# Patient Record
(demographics unavailable — no encounter records)

---

## 2024-11-13 NOTE — REVIEW OF SYSTEMS
[Negative] : Integumentary [Skin Hyperpigmentation: Grade 1 - Hyperpigmentation covering <10% BSA; no psychosocial impact] : Skin Hyperpigmentation: Grade 1 - Hyperpigmentation covering <10% BSA; no psychosocial impact [Dermatitis Radiation: Grade 0] : Dermatitis Radiation: Grade 0

## 2024-11-13 NOTE — VITALS
[Maximal Pain Intensity: 0/10] : 0/10 [Least Pain Intensity: 0/10] : 0/10 [NoTreatment Scheduled] : no treatment scheduled [100: Normal, no complaints, no evidence of disease.] : 100: Normal, no complaints, no evidence of disease. [3 - Distress Level] : Distress Level: 3

## 2024-11-19 NOTE — DISEASE MANAGEMENT
[Pathological] : TNM Stage: p [IA] : IA [FreeTextEntry4] : ER/IL+ Invasive Ductal Carcinoma of the Left Breast  [TTNM] : 1c [NTNM] : 0 [MTNM] : 0

## 2024-11-19 NOTE — PHYSICAL EXAM
[Normal] : oriented to person, place and time, the affect was normal, the mood was normal and not anxious [de-identified] : s/p left partial mastectomy

## 2024-11-19 NOTE — DISEASE MANAGEMENT
[Pathological] : TNM Stage: p [IA] : IA [FreeTextEntry4] : ER/VA+ Invasive Ductal Carcinoma of the Left Breast  [TTNM] : 1c [NTNM] : 0 [MTNM] : 0

## 2024-11-19 NOTE — PHYSICAL EXAM
[Normal] : oriented to person, place and time, the affect was normal, the mood was normal and not anxious [de-identified] : s/p left partial mastectomy

## 2024-11-19 NOTE — HISTORY OF PRESENT ILLNESS
[FreeTextEntry1] : Ms. Burt is a 79-year-old female with Stage IA T1cN0 left breast invasive ductal carcinoma ER/KS+. She is s/p left partial mastectomy and sentinel lymph node biopsy followed by adjuvant radiation therapy. She is present today for her routine follow-up.   Of note, she does have a history of ovarian cancer and received radioactive phosphorous 32 intraperitoneal in 1988 and has been MILLY since.   Ms. Burt initially presented for a screening mammogram to [facility] on 5/18/23 that revealed an asymmetric density of the upper left breast. Diagnostic mammogram was completed on 6/26/23 and revealed a hypoechoic 9 mm mass 8 cm from the nipple corresponding to the initial findings.   She underwent an US-guided biopsy of the left breast on 7/3/23, pathology demonstrated an intermediate grade invasive ductal carcinoma which was ER/KS strongly positive.   An MRI of the breast was completed on 8/15/23 that showed the known left breast cancer at 3:00 measuring 2.3 cm with an area of enhancement just superior and medial to the known cancer as well as a finding in the right breast at the 6:00 axis and an MRI-guided biopsy was recommended. On 8/22/23 an MRI-guided biopsy was performed and showed in the left breast 3:00 anterior and right breast 6:00  revealed intraductal papillomas with ductal hyperplasia but no atypia.   On 9/19/23 Ms. Burt underwent a left partial mastectomy and sentinel lymph node biopsy with excision of the separate 3:00 papilloma in the same specimen performed by  Dr. Carcamo. Final pathology revealed a 1.6 cm intermediate grade invasive ductal carcinoma with neuroendocrine features with a 1.6cm focus of grade II DCIS, negative for EIC. All margins were negative with the nearest margin being 8 mm from the inferior margin for both the DCIS and the invasive component. 0/3 lymph nodes positive for metastasis.   Oncotype score was 9. Per patient, genetics testing had been completed previously and was negative.   Ms. Burt completed adjuvant radiation therapy to the left breast, to a total of 4240 cGy on 12/18/23.   Ms. Burt reports she is doing well. She reports mild tenderness to the treated left breast.  She reports nipple distortion and rash and was treated with fungal cream with good response. She continues on Anastrozole - she reports some side effects including fatigue. She has follow-up with Dr. Carcamo in September 2024. She had routine bilateral mammography and ultrasound (July 2024) that demonstrated no mammographic or sonographic evidence of malignancy. She had follow-up with medical oncology in September. She reports she fell walking and broke her hand. She is now in PT/OT. She is otherwise is feeling well and she is staying active.

## 2024-11-19 NOTE — HISTORY OF PRESENT ILLNESS
[FreeTextEntry1] : Ms. Burt is a 79-year-old female with Stage IA T1cN0 left breast invasive ductal carcinoma ER/FL+. She is s/p left partial mastectomy and sentinel lymph node biopsy followed by adjuvant radiation therapy. She is present today for her routine follow-up.   Of note, she does have a history of ovarian cancer and received radioactive phosphorous 32 intraperitoneal in 1988 and has been MILLY since.   Ms. Burt initially presented for a screening mammogram to [facility] on 5/18/23 that revealed an asymmetric density of the upper left breast. Diagnostic mammogram was completed on 6/26/23 and revealed a hypoechoic 9 mm mass 8 cm from the nipple corresponding to the initial findings.   She underwent an US-guided biopsy of the left breast on 7/3/23, pathology demonstrated an intermediate grade invasive ductal carcinoma which was ER/FL strongly positive.   An MRI of the breast was completed on 8/15/23 that showed the known left breast cancer at 3:00 measuring 2.3 cm with an area of enhancement just superior and medial to the known cancer as well as a finding in the right breast at the 6:00 axis and an MRI-guided biopsy was recommended. On 8/22/23 an MRI-guided biopsy was performed and showed in the left breast 3:00 anterior and right breast 6:00  revealed intraductal papillomas with ductal hyperplasia but no atypia.   On 9/19/23 Ms. Burt underwent a left partial mastectomy and sentinel lymph node biopsy with excision of the separate 3:00 papilloma in the same specimen performed by  Dr. Carcamo. Final pathology revealed a 1.6 cm intermediate grade invasive ductal carcinoma with neuroendocrine features with a 1.6cm focus of grade II DCIS, negative for EIC. All margins were negative with the nearest margin being 8 mm from the inferior margin for both the DCIS and the invasive component. 0/3 lymph nodes positive for metastasis.   Oncotype score was 9. Per patient, genetics testing had been completed previously and was negative.   Ms. Burt completed adjuvant radiation therapy to the left breast, to a total of 4240 cGy on 12/18/23.   Ms. Burt reports she is doing well. She reports mild tenderness to the treated left breast.  She reports nipple distortion and rash and was treated with fungal cream with good response. She continues on Anastrozole - she reports some side effects including fatigue. She has follow-up with Dr. Carcamo in September 2024. She had routine bilateral mammography and ultrasound (July 2024) that demonstrated no mammographic or sonographic evidence of malignancy. She had follow-up with medical oncology in September. She reports she fell walking and broke her hand. She is now in PT/OT. She is otherwise is feeling well and she is staying active.

## 2025-01-15 NOTE — HISTORY OF PRESENT ILLNESS
[de-identified] : Ms. Burt is a 78 year old female with a history of a personal history of ovarian cancer diagnosed in  s/p SHELLY BSO s/p intraperitoneal infusion of P32. She notes she then received chemo about 2 years later for a precancerous vaginal cyst that presents for initial consultation referred by Dr. Carcamo for newly diagnosed breast cancer.   Oncological History:   2023 s/p screening mammography at [facility] showing an asymmetric density in the left breast upper quadrant  2023 s/p diagnostic mammography and ultrasound showed an 8mm suspicious finding in the 3:00 region 8 cm from the nipple.   2023 s/p Ultrasound-guided core biopsy showed a moderately differentiated invasive duct cancer which was ER/CO strongly positive and HER2/ronaldo negative.   2023 s/p MRI showing the known left breast 3:00 cancer measuring 2.3 cm and some enhancement superior and medial to the known cancer in the left breast as well as a finding in the right breast 6:00 region.   2023 s/p MRI core biopsies showed a separate left breast anterior 3:00 finding and right breast 6:00 finding to be intraductal papillomas with no atypia.   23 s/p Partial mastectomy with sentinel lymph node biopsy removal the separate left breast 3:00 papilloma within the same partial mastectomy final pathology showed a 1.6 cm intermediate grade invasive duct cancer with negative margins and 3 negative left axillary lymph nodes.  Prognostic markers performed on block  D23: ER:>95% strong positive CO:>95% strong positive. KI-67: up to 30% Her2: Negative (score 1+)  Oncotype recurrence 9.   Breast Cancer Risk Factors: Menarche: 11 Date of LMP: age 43 Menopause: age 43  Age at first live birth: 19 Nursed: yes Hysterectomy: yes Oophorectomy: yes OCP: yes 5 years HRT: yes Last pap/pelvic exam: has not gone in many years  Related family history family history of breast cancer and a personal history of ovarian cancer diagnosed in  for which she underwent an intraperitoneal infusion of P32. Ashkenazi: no BRCA testing yes had in the past was negative per patient   [de-identified] : Patient seen and examined for routine follow up.  Saw Dr. Carcamo 9/13/24. Remains on Anastrazole tolerating. +recent mechanical fall broke hand. In splint currently and seeing hand surgeon tomorrow. s/p mammo/sono 7/2024 wnl. GALLITO 2 was referred back to cardio/primary.

## 2025-01-15 NOTE — CONSULT LETTER
[Dear  ___] : Dear  [unfilled], [Consult Letter:] : I had the pleasure of evaluating your patient, [unfilled]. [Please see my note below.] : Please see my note below. [Consult Closing:] : Thank you very much for allowing me to participate in the care of this patient.  If you have any questions, please do not hesitate to contact me. [Sincerely,] : Sincerely, [FreeTextEntry3] : Jeana Major DO, FACSUSAN, FACP Medical Oncology and Hematology [facility]

## 2025-01-15 NOTE — ASSESSMENT
[FreeTextEntry1] : #Breast Cancer - 1.6 cm intermediate grade invasive duct cancer with negative margins and 3 negative left axillary lymph nodes.  ER/OR+, HER2 -  - Reviewed the diagnosis, prognosis and natural history of ER/OR+, HER2 - IDC - Reviewed the imaging and path - Reviewed the NCCN guidelines and patient expresses understanding - Oncotype 9 - Currently receiving RT. Will complete soon - Plan for DEXA later this week will discuss results - As long as there is no sig osteoporosis will offer AI. Otherwise will discuss tamoxifen - She will continue to follow up with Dr. Carcamo and Dr. Cast - Bilateral mammography and ultrasound will be due in May 2024. - 2/21/24 vs and CBC reviewed; WBC 4.16, hgb 13.5, plt 370. Now s/p completion of adjuvant radiation therapy to the left breast, to a total of 4240 cGy. She has some residual fatigue and hyperpigmentation. She did have COVID s/p paxlovid and did not require hospitalization, some exertional dyspnea with COVID and RT when going up stairs. Nothing at rest O2 wnl. Saw Dr. Carcamo today. Reviewed DEXA 12/2023 revealing L spine -1.5, L fem neck -1.9, L hip -0.5. Given osteopenia will give AI. Reviewed side effects and logistics. Will offer Anastrazole 1mg daily. Ca++ 1200mg daily vit D 800iu daily. We will monitor DEXA q2y.  - 9/2024 vs and labs reviewed; Raajn missed few appts remains on anastrazole, tolerating. Continue. s/p follow up with Dr. Carcamo 9/2024 note reviewed. s/p mammo/sono 7/2024 wnl. GALLITO 2 referred to cardio/primary   #Osteopenia  - Reviewed DEXA 12/2023 revealing L spine -1.5, L fem neck -1.9, L hip -0.5. Given osteopenia will give AI - Ca++ 1200mg daily vit D 800iu daily.  - We will monitor DEXA q2y.  - weight bearing exercise - Limit ETOH  - Maintain healthy BMI  - Given osteopenia discussed bisphosphonates in future such as prolia vs zometa would need dental clearance  #Vit D  - Monitor levels   #Hand Fracture - In splint following mechanical fall  - Seeing hand surgeon tomorrow   #Health Maintenance  - CNY UTD per patient  - GYN UTD  - Mammo/sono 7/2024  - DEXA 2023  RTC in 3 months with cbc with diff, cmp, vit D  Case and management discussed with Pedrito Valdez

## 2025-01-15 NOTE — PHYSICAL EXAM
[Fully active, able to carry on all pre-disease performance without restriction] : Status 0 - Fully active, able to carry on all pre-disease performance without restriction [Normal] : affect appropriate [de-identified] : L lumpectomy scar and SLN bx, no masses nodules or LAD  [de-identified] : in R hand splint s/p fracture. Going to see hand surgeon tomorrow +ecchymosis

## 2025-01-15 NOTE — REVIEW OF SYSTEMS
[Joint Pain] : joint pain [Negative] : Allergic/Immunologic [FreeTextEntry9] : s/p broken hand in splint with brusiing

## 2025-02-03 NOTE — PAST MEDICAL HISTORY
[Menarche Age ____] : age at menarche was [unfilled] [History of Hormone Replacement Treatment] : has a history of hormone replacement treatment [Total Preg ___] : G[unfilled] [Age At Live Birth ___] : Age at live birth: [unfilled]

## 2025-02-03 NOTE — ADDENDUM
[FreeTextEntry1] : I spent greater than 75% of consultation in face-to-face counseling and coordination of care in this patient with a history of a left breast cancer who underwent a left breast partial mastectomy who comes in now for routine breast cancer screening/surveillance.

## 2025-02-03 NOTE — PHYSICAL EXAM
[Normocephalic] : normocephalic [Atraumatic] : atraumatic [Supple] : supple [No Supraclavicular Adenopathy] : no supraclavicular adenopathy [No Cervical Adenopathy] : no cervical adenopathy [Examined in the supine and seated position] : examined in the supine and seated position [No dominant masses] : no dominant masses in right breast  [No dominant masses] : no dominant masses left breast [No Nipple Retraction] : no left nipple retraction [No Nipple Discharge] : no left nipple discharge [Breast Mass Right Breast ___cm] : no masses [Breast Mass Left Breast ___cm] : no masses [Breast Nipple Inversion] : nipples not inverted [Breast Nipple Retraction] : nipples not retracted [Breast Nipple Flattening] : nipples not flattened [Breast Nipple Fissures] : nipples not fissured [Breast Abnormal Lactation (Galactorrhea)] : no galactorrhea [Breast Abnormal Secretion Serous Fluid] : no serous discharge [Breast Abnormal Secretion Bloody Fluid] : no bloody discharge [Breast Abnormal Secretion Opalescent Fluid] : no milky discharge [No Axillary Lymphadenopathy] : no left axillary lymphadenopathy [No Edema] : no edema [No Rashes] : no rashes [No Ulceration] : no ulceration [de-identified] : On exam, the patient is doing well after her left breast partial mastectomy and localized excisional biopsy.  Her wound has healed well and she received external beam radiation and she has no evidence of recurrence.  She had developed a significant seroma and I placed a seroma catheter which was pulled after 2 weeks.  She has no evidence of recurrent seroma.  She has no axillary, supraclavicular, or cervical adenopathy. [de-identified] : Status post partial mastectomy with separate localized excisional biopsy.  She developed a chronic seroma and I placed a seroma catheter which was removed after 2 weeks and she has no evidence of recurrent seroma.  She underwent external beam radiation and has no evidence of recurrence.

## 2025-02-03 NOTE — HISTORY OF PRESENT ILLNESS
[FreeTextEntry1] : The patient is a 79-year-old G8, P8 postmenopausal white female.  She underwent menopause in 1988 at age 43 after a SHELLY/BSO performed for fibroids which found an incidental ovarian cancer.  She did receive radioactive phosphorus 32 intraperitoneal at that time.  She has been MILLY from that ovarian cancer.  She never took any hormone replacement therapy.  She underwent menarche at age 11 and had her first child at age 19.  She has a family history with her sister who had breast cancer.  Her father had lung and esophageal cancer.  Her paternal grandmother had bladder cancer and a maternal uncle had colon cancer.  She underwent screening mammography at [facility] on May 18, 2023 showing an asymmetric density in the upper left breast.  Diagnostic left breast mammography and ultrasound performed on June 26, 2023 showed a hypoechoic 9 mm mass 8 cm from the nipple which seem to correspond to the mammographic finding.  Ultrasound-guided core biopsy was performed on July 3, 2023 at [facility] showing an intermediate grade invasive duct cancer which was ER/FL strongly positive greater than 95% and HER2 negative 1+ by IHC with a Ki-67 of 10%.  MRI was performed on August 15, 2023 which showed the known left breast 3:00 cancer measuring 2.3 cm with an area of enhancement just superior and medial to the known cancer as well as a finding in the right breast 6:00 region for which MRI guided biopsy was recommended.  MRI biopsies were performed on August 22, 2023 of the left breast 3:00 anterior finding and right breast 6:00 region and both of these biopsies showed intraductal papillomas with ductal hyperplasia but no atypia.  She had "infinity" clips placed at both biopsy sites.  A left breast partial mastectomy and sentinel lymph node biopsy was then performed with excision of this separate anterior 3:00 papilloma within the same specimen using 2 Hologic localizers on September 19, 2023.  The final pathology showed a 1.6 cm intermediate grade invasive duct cancer with neuroendocrine features and all her final margins were all negative.  She had 2 negative sentinel lymph nodes and 1 negative nonsentinel lymph node making this a pathologic prognostic stage IA breast cancer.   An Oncotype recurrence score was 9 and she was seen by Dr. Major and an AI was recommended after radiation therapy.  She underwent hypofractionated radiation therapy through Dr. Cast which finished on December 18, 2023 and she was then placed on anastrozole.  She had developed a postoperative seroma requiring placement of a seroma catheter which was left in for about 2 weeks.  She comes in now for routine follow-up.

## 2025-02-03 NOTE — REASON FOR VISIT
[Follow-Up: _____] : a [unfilled] follow-up visit [FreeTextEntry1] : The patient is a postmenopausal white female with a family history of breast cancer and a personal history of ovarian cancer diagnosed in 1988 for which she underwent an intraperitoneal infusion of P32.  The patient underwent a screening mammography at [facility] in May 2023 showing an asymmetric density in the left breast upper quadrant and diagnostic mammography and ultrasound performed in June 2023 showed an 8mm suspicious finding in the 3:00 region 8 cm from the nipple.  Ultrasound-guided core biopsy performed in July 2023 showed a moderately differentiated invasive duct cancer which was ER/KY strongly positive and HER2/ronaldo negative.  She underwent an MRI in August 2023 showing the known left breast 3:00 cancer measuring 2.3 cm and some enhancement superior and medial to the known cancer in the left breast as well as a finding in the right breast 6:00 region.  MRI core biopsies in August 2023 showed a separate left breast anterior 3:00 finding and right breast 6:00 finding to be intraductal papillomas with no atypia.  Partial mastectomy was recommended with sentinel lymph node biopsy and I also removed the separate left breast 3:00 papilloma within the same partial mastectomy on September 19, 2023.  The final pathology showed a 1.6 cm intermediate grade invasive duct cancer with negative margins and 3 negative left axillary lymph nodes making this a pathologic prognostic stage IA breast cancer.  Oncotype recurrence score was 9 and she was seen by Dr. Major and placed on anastrozole after radiation.  She underwent external beam radiation with Dr. Cast which finished in December 2023.  She did require a seroma catheter postoperatively.  She comes in now for routine follow-up. [FreeTextEntry2] : September 19, 2023

## 2025-02-03 NOTE — ASSESSMENT
[FreeTextEntry1] : The patient is a 79-year-old G8, P8 postmenopausal white female. She underwent menopause in 1988 at age 43 after a SHELLY/BSO performed for fibroids which found an incidental ovarian cancer. She did receive radioactive phosphorus 32 intraperitoneal at that time. She has been MILLY from that ovarian cancer. She never took any hormone replacement therapy. She underwent menarche at age 11 and had her first child at age 19. She has a family history with her sister who had breast cancer. Her father had lung and esophageal cancer. Her paternal grandmother had bladder cancer and a maternal uncle had colon cancer. She underwent screening mammography at [facility] on May 18, 2023 showing an asymmetric density in the upper left breast. Diagnostic left breast mammography and ultrasound performed on June 26, 2023 showed a hypoechoic 9 mm mass 8 cm from the nipple which seem to correspond to the mammographic finding. Ultrasound-guided core biopsy was performed on July 3, 2023 at [facility] showing an intermediate grade invasive duct cancer which was ER/ME strongly positive greater than 95% and HER2 negative 1+ by IHC with a Ki-67 of 10%.MRI was performed on August 15, 2023 which showed the known left breast 3:00 cancer measuring 2.3 cm with an area of enhancement just superior and medial to the known cancer as well as a finding in the right breast 6:00 region for which MRI guided biopsy was recommended. MRI biopsies were performed on August 22, 2023 of the left breast 3:00 anterior finding and right breast 6:00 region and both of these biopsies showed intraductal papillomas with ductal hyperplasia but no atypia. She had "infinity" clips placed at both biopsy sites. A left breast partial mastectomy and sentinel lymph node biopsy was then performed with excision of this separate anterior 3:00 papilloma within the same specimen using 2 Hologic localizers on September 19, 2023. The final pathology showed a 1.6 cm intermediate grade invasive duct cancer with neuroendocrine features and all her final margins were all negative. She had 2 negative sentinel lymph nodes and 1 negative nonsentinel lymph node making this a pathologic prognostic stage IA breast cancer.  The Oncotype recurrence score was 9 and she was seen by Dr. Major and anastrozole was recommended. She understood the need for radiation therapy and she underwent hypofractionated external beam radiation with Dr. Cast which finished on December 18, 2023.  She was seen by Dr. Major and placed on anastrozole.  She did develop a chronic recurring large seroma postoperatively and I had to place a seroma catheter.  She underwent her last bilateral mammography and ultrasound which was reviewed from July 17, 2024 performed at [place] which showed postsurgical changes in the left breast but no suspicious findings.  She had a breast arterial calcification score of 2 and she was given a copy of her report to speak to her primary care physician about possible cardiac workup due to correlation with cardiovascular disease ?????.  On exam today, she has done well from her left breast partial mastectomy but she has developed some volume loss on the lateral aspect of the left breast with some slight nipple lateral distortion.  She should continue to wear good compressive bra in the meantime.   She also has a slight rash around the nipple region ??????? which I think is a small fungal rash and I will send in a prescription for betamethasone clotrimazole cream.  I would like to see her again in another 6 months for routine follow-up.  Her next routine bilateral mammography and ultrasound will be due in July 2025 and she was given prescriptions. She should continue to follow-up with Dr. Major who has placed her on anastrozole.

## 2025-02-25 NOTE — END OF VISIT
[Time Spent: ___ minutes] : I have spent [unfilled] minutes of time on the encounter which excludes teaching and separately reported services. supine

## 2025-03-04 NOTE — PHYSICAL EXAM
[Normocephalic] : normocephalic [Atraumatic] : atraumatic [Supple] : supple [No Supraclavicular Adenopathy] : no supraclavicular adenopathy [No Cervical Adenopathy] : no cervical adenopathy [Examined in the supine and seated position] : examined in the supine and seated position [No dominant masses] : no dominant masses in right breast  [No dominant masses] : no dominant masses left breast [No Nipple Retraction] : no left nipple retraction [No Nipple Discharge] : no left nipple discharge [Breast Mass Right Breast ___cm] : no masses [Breast Mass Left Breast ___cm] : no masses [No Axillary Lymphadenopathy] : no left axillary lymphadenopathy [No Edema] : no edema [No Rashes] : no rashes [No Ulceration] : no ulceration [Breast Nipple Inversion] : nipples not inverted [Breast Nipple Retraction] : nipples not retracted [Breast Nipple Flattening] : nipples not flattened [Breast Nipple Fissures] : nipples not fissured [Breast Abnormal Lactation (Galactorrhea)] : no galactorrhea [Breast Abnormal Secretion Bloody Fluid] : no bloody discharge [Breast Abnormal Secretion Serous Fluid] : no serous discharge [Breast Abnormal Secretion Opalescent Fluid] : no milky discharge [de-identified] : On exam, the patient is doing well after her left breast partial mastectomy and localized excisional biopsy.  Her wound has healed well and she received external beam radiation and she has no evidence of recurrence.  She had developed a significant seroma and I placed a seroma catheter which was pulled after 2 weeks.  She has no evidence of recurrent seroma.  She has no axillary, supraclavicular, or cervical adenopathy. [de-identified] : Status post partial mastectomy with separate localized excisional biopsy.  She developed a chronic seroma and I placed a seroma catheter which was removed after 2 weeks and she has no evidence of recurrent seroma.  She underwent external beam radiation and has no evidence of recurrence.

## 2025-03-04 NOTE — REASON FOR VISIT
[Follow-Up: _____] : a [unfilled] follow-up visit [FreeTextEntry1] : The patient is a postmenopausal white female with a family history of breast cancer and a personal history of ovarian cancer diagnosed in 1988 for which she underwent an intraperitoneal infusion of P32.  The patient underwent a screening mammography at [facility] in May 2023 showing an asymmetric density in the left breast upper quadrant and diagnostic mammography and ultrasound performed in June 2023 showed an 8mm suspicious finding in the 3:00 region 8 cm from the nipple.  Ultrasound-guided core biopsy performed in July 2023 showed a moderately differentiated invasive duct cancer which was ER/SC strongly positive and HER2/ronaldo negative.  She underwent an MRI in August 2023 showing the known left breast 3:00 cancer measuring 2.3 cm and some enhancement superior and medial to the known cancer in the left breast as well as a finding in the right breast 6:00 region.  MRI core biopsies in August 2023 showed a separate left breast anterior 3:00 finding and right breast 6:00 finding to be intraductal papillomas with no atypia.  Partial mastectomy was recommended with sentinel lymph node biopsy and I also removed the separate left breast 3:00 papilloma within the same partial mastectomy on September 19, 2023.  The final pathology showed a 1.6 cm intermediate grade invasive duct cancer with negative margins and 3 negative left axillary lymph nodes making this a pathologic prognostic stage IA breast cancer.  Oncotype recurrence score was 9 and she was seen by Dr. Major and placed on anastrozole after radiation.  She underwent external beam radiation with Dr. Cast which finished in December 2023.  She did require a seroma catheter postoperatively.  She comes in now for routine follow-up. [FreeTextEntry2] : September 19, 2023

## 2025-03-04 NOTE — PHYSICAL EXAM
[Normocephalic] : normocephalic [Atraumatic] : atraumatic [Supple] : supple [No Supraclavicular Adenopathy] : no supraclavicular adenopathy [No Cervical Adenopathy] : no cervical adenopathy [Examined in the supine and seated position] : examined in the supine and seated position [No dominant masses] : no dominant masses in right breast  [No dominant masses] : no dominant masses left breast [No Nipple Retraction] : no left nipple retraction [No Nipple Discharge] : no left nipple discharge [Breast Mass Right Breast ___cm] : no masses [Breast Mass Left Breast ___cm] : no masses [No Axillary Lymphadenopathy] : no left axillary lymphadenopathy [No Edema] : no edema [No Rashes] : no rashes [No Ulceration] : no ulceration [Breast Nipple Inversion] : nipples not inverted [Breast Nipple Retraction] : nipples not retracted [Breast Nipple Flattening] : nipples not flattened [Breast Nipple Fissures] : nipples not fissured [Breast Abnormal Lactation (Galactorrhea)] : no galactorrhea [Breast Abnormal Secretion Bloody Fluid] : no bloody discharge [Breast Abnormal Secretion Serous Fluid] : no serous discharge [Breast Abnormal Secretion Opalescent Fluid] : no milky discharge [de-identified] : On exam, the patient is doing well after her left breast partial mastectomy and localized excisional biopsy.  Her wound has healed well and she received external beam radiation and she has no evidence of recurrence.  She had developed a significant seroma and I placed a seroma catheter which was pulled after 2 weeks.  She has no evidence of recurrent seroma.  She has no axillary, supraclavicular, or cervical adenopathy. [de-identified] : Status post partial mastectomy with separate localized excisional biopsy.  She developed a chronic seroma and I placed a seroma catheter which was removed after 2 weeks and she has no evidence of recurrent seroma.  She underwent external beam radiation and has no evidence of recurrence.

## 2025-03-04 NOTE — HISTORY OF PRESENT ILLNESS
[FreeTextEntry1] : The patient is a 79-year-old G8, P8 postmenopausal white female.  She underwent menopause in 1988 at age 43 after a SHELLY/BSO performed for fibroids which found an incidental ovarian cancer.  She did receive radioactive phosphorus 32 intraperitoneal at that time.  She has been MILLY from that ovarian cancer.  She never took any hormone replacement therapy.  She underwent menarche at age 11 and had her first child at age 19.  She has a family history with her sister who had breast cancer.  Her father had lung and esophageal cancer.  Her paternal grandmother had bladder cancer and a maternal uncle had colon cancer.  She underwent screening mammography at [facility] on May 18, 2023 showing an asymmetric density in the upper left breast.  Diagnostic left breast mammography and ultrasound performed on June 26, 2023 showed a hypoechoic 9 mm mass 8 cm from the nipple which seem to correspond to the mammographic finding.  Ultrasound-guided core biopsy was performed on July 3, 2023 at [facility] showing an intermediate grade invasive duct cancer which was ER/ND strongly positive greater than 95% and HER2 negative 1+ by IHC with a Ki-67 of 10%.  MRI was performed on August 15, 2023 which showed the known left breast 3:00 cancer measuring 2.3 cm with an area of enhancement just superior and medial to the known cancer as well as a finding in the right breast 6:00 region for which MRI guided biopsy was recommended.  MRI biopsies were performed on August 22, 2023 of the left breast 3:00 anterior finding and right breast 6:00 region and both of these biopsies showed intraductal papillomas with ductal hyperplasia but no atypia.  She had "infinity" clips placed at both biopsy sites.  A left breast partial mastectomy and sentinel lymph node biopsy was then performed with excision of this separate anterior 3:00 papilloma within the same specimen using 2 Hologic localizers on September 19, 2023.  The final pathology showed a 1.6 cm intermediate grade invasive duct cancer with neuroendocrine features and all her final margins were all negative.  She had 2 negative sentinel lymph nodes and 1 negative nonsentinel lymph node making this a pathologic prognostic stage IA breast cancer.   An Oncotype recurrence score was 9 and she was seen by Dr. Major and an AI was recommended after radiation therapy.  She underwent hypofractionated radiation therapy through Dr. Cast which finished on December 18, 2023 and she was then placed on anastrozole.  She had developed a postoperative seroma requiring placement of a seroma catheter which was left in for about 2 weeks.  She comes in now for routine follow-up.
[FreeTextEntry1] : The patient is a 79-year-old G8, P8 postmenopausal white female.  She underwent menopause in 1988 at age 43 after a SHELLY/BSO performed for fibroids which found an incidental ovarian cancer.  She did receive radioactive phosphorus 32 intraperitoneal at that time.  She has been MILLY from that ovarian cancer.  She never took any hormone replacement therapy.  She underwent menarche at age 11 and had her first child at age 19.  She has a family history with her sister who had breast cancer.  Her father had lung and esophageal cancer.  Her paternal grandmother had bladder cancer and a maternal uncle had colon cancer.  She underwent screening mammography at [facility] on May 18, 2023 showing an asymmetric density in the upper left breast.  Diagnostic left breast mammography and ultrasound performed on June 26, 2023 showed a hypoechoic 9 mm mass 8 cm from the nipple which seem to correspond to the mammographic finding.  Ultrasound-guided core biopsy was performed on July 3, 2023 at [facility] showing an intermediate grade invasive duct cancer which was ER/NC strongly positive greater than 95% and HER2 negative 1+ by IHC with a Ki-67 of 10%.  MRI was performed on August 15, 2023 which showed the known left breast 3:00 cancer measuring 2.3 cm with an area of enhancement just superior and medial to the known cancer as well as a finding in the right breast 6:00 region for which MRI guided biopsy was recommended.  MRI biopsies were performed on August 22, 2023 of the left breast 3:00 anterior finding and right breast 6:00 region and both of these biopsies showed intraductal papillomas with ductal hyperplasia but no atypia.  She had "infinity" clips placed at both biopsy sites.  A left breast partial mastectomy and sentinel lymph node biopsy was then performed with excision of this separate anterior 3:00 papilloma within the same specimen using 2 Hologic localizers on September 19, 2023.  The final pathology showed a 1.6 cm intermediate grade invasive duct cancer with neuroendocrine features and all her final margins were all negative.  She had 2 negative sentinel lymph nodes and 1 negative nonsentinel lymph node making this a pathologic prognostic stage IA breast cancer.   An Oncotype recurrence score was 9 and she was seen by Dr. Major and an AI was recommended after radiation therapy.  She underwent hypofractionated radiation therapy through Dr. Cast which finished on December 18, 2023 and she was then placed on anastrozole.  She had developed a postoperative seroma requiring placement of a seroma catheter which was left in for about 2 weeks.  She comes in now for routine follow-up.
 / Black

## 2025-03-04 NOTE — ASSESSMENT
[FreeTextEntry1] : The patient is a 79-year-old G8, P8 postmenopausal white female. She underwent menopause in 1988 at age 43 after a SHELLY/BSO performed for fibroids which found an incidental ovarian cancer. She did receive radioactive phosphorus 32 intraperitoneal at that time. She has been MILLY from that ovarian cancer. She never took any hormone replacement therapy. She underwent menarche at age 11 and had her first child at age 19. She has a family history with her sister who had breast cancer. Her father had lung and esophageal cancer. Her paternal grandmother had bladder cancer and a maternal uncle had colon cancer. She underwent screening mammography at [facility] on May 18, 2023 showing an asymmetric density in the upper left breast. Diagnostic left breast mammography and ultrasound performed on June 26, 2023 showed a hypoechoic 9 mm mass 8 cm from the nipple which seem to correspond to the mammographic finding. Ultrasound-guided core biopsy was performed on July 3, 2023 at [facility] showing an intermediate grade invasive duct cancer which was ER/VA strongly positive greater than 95% and HER2 negative 1+ by IHC with a Ki-67 of 10%.MRI was performed on August 15, 2023 which showed the known left breast 3:00 cancer measuring 2.3 cm with an area of enhancement just superior and medial to the known cancer as well as a finding in the right breast 6:00 region for which MRI guided biopsy was recommended. MRI biopsies were performed on August 22, 2023 of the left breast 3:00 anterior finding and right breast 6:00 region and both of these biopsies showed intraductal papillomas with ductal hyperplasia but no atypia. She had "infinity" clips placed at both biopsy sites. A left breast partial mastectomy and sentinel lymph node biopsy was then performed with excision of this separate anterior 3:00 papilloma within the same specimen using 2 Hologic localizers on September 19, 2023. The final pathology showed a 1.6 cm intermediate grade invasive duct cancer with neuroendocrine features and all her final margins were all negative. She had 2 negative sentinel lymph nodes and 1 negative nonsentinel lymph node making this a pathologic prognostic stage IA breast cancer.  The Oncotype recurrence score was 9 and she was seen by Dr. Major and anastrozole was recommended. She understood the need for radiation therapy and she underwent hypofractionated external beam radiation with Dr. Cast which finished on December 18, 2023.  She was seen by Dr. Major and placed on anastrozole.  She did develop a chronic recurring large seroma postoperatively and I had to place a seroma catheter.  She underwent her last bilateral mammography and ultrasound which was reviewed from July 17, 2024 performed at [facility] which showed postsurgical changes in the left breast but no suspicious findings.  She had a breast arterial calcification score of 2 and she was given a copy of her report to speak to her primary care physician about possible cardiac workup due to correlation with cardiovascular disease ?????.  On exam today, she has done well from her left breast partial mastectomy but she has developed some volume loss on the lateral aspect of the left breast with some slight nipple lateral distortion.  Her prior rash on the left breast has resolved after I gave her a prescription for betamethasone clotrimazole cream.  I would like to see her again in another 6 months for routine follow-up.  Her next routine bilateral mammography and ultrasound will be due in July 2025 and she was given prescriptions. She should continue to follow-up with Dr. Major who has placed her on anastrozole.

## 2025-03-04 NOTE — ASSESSMENT
[FreeTextEntry1] : The patient is a 79-year-old G8, P8 postmenopausal white female. She underwent menopause in 1988 at age 43 after a SHELLY/BSO performed for fibroids which found an incidental ovarian cancer. She did receive radioactive phosphorus 32 intraperitoneal at that time. She has been MILLY from that ovarian cancer. She never took any hormone replacement therapy. She underwent menarche at age 11 and had her first child at age 19. She has a family history with her sister who had breast cancer. Her father had lung and esophageal cancer. Her paternal grandmother had bladder cancer and a maternal uncle had colon cancer. She underwent screening mammography at [facility] [place] on May 18, 2023 showing an asymmetric density in the upper left breast. Diagnostic left breast mammography and ultrasound performed on June 26, 2023 showed a hypoechoic 9 mm mass 8 cm from the nipple which seem to correspond to the mammographic finding. Ultrasound-guided core biopsy was performed on July 3, 2023 at [facility] [place] showing an intermediate grade invasive duct cancer which was ER/LA strongly positive greater than 95% and HER2 negative 1+ by IHC with a Ki-67 of 10%.MRI was performed on August 15, 2023 which showed the known left breast 3:00 cancer measuring 2.3 cm with an area of enhancement just superior and medial to the known cancer as well as a finding in the right breast 6:00 region for which MRI guided biopsy was recommended. MRI biopsies were performed on August 22, 2023 of the left breast 3:00 anterior finding and right breast 6:00 region and both of these biopsies showed intraductal papillomas with ductal hyperplasia but no atypia. She had "infinity" clips placed at both biopsy sites. A left breast partial mastectomy and sentinel lymph node biopsy was then performed with excision of this separate anterior 3:00 papilloma within the same specimen using 2 Hologic localizers on September 19, 2023. The final pathology showed a 1.6 cm intermediate grade invasive duct cancer with neuroendocrine features and all her final margins were all negative. She had 2 negative sentinel lymph nodes and 1 negative nonsentinel lymph node making this a pathologic prognostic stage IA breast cancer.  The Oncotype recurrence score was 9 and she was seen by Dr. Major and anastrozole was recommended. She understood the need for radiation therapy and she underwent hypofractionated external beam radiation with Dr. Cast which finished on December 18, 2023.  She was seen by Dr. Major and placed on anastrozole.  She did develop a chronic recurring large seroma postoperatively and I had to place a seroma catheter.  She underwent her last bilateral mammography and ultrasound which was reviewed from July 17, 2024 performed at [facility] which showed postsurgical changes in the left breast but no suspicious findings.  She had a breast arterial calcification score of 2 and she was given a copy of her report to speak to her primary care physician about possible cardiac workup due to correlation with cardiovascular disease ?????.  On exam today, she has done well from her left breast partial mastectomy but she has developed some volume loss on the lateral aspect of the left breast with some slight nipple lateral distortion.  Her prior rash on the left breast has resolved after I gave her a prescription for betamethasone clotrimazole cream.  I would like to see her again in another 6 months for routine follow-up.  Her next routine bilateral mammography and ultrasound will be due in July 2025 and she was given prescriptions. She should continue to follow-up with Dr. Major who has placed her on anastrozole.

## 2025-03-04 NOTE — REASON FOR VISIT
[Follow-Up: _____] : a [unfilled] follow-up visit [FreeTextEntry1] : The patient is a postmenopausal white female with a family history of breast cancer and a personal history of ovarian cancer diagnosed in 1988 for which she underwent an intraperitoneal infusion of P32.  The patient underwent a screening mammography at [facility] in May 2023 showing an asymmetric density in the left breast upper quadrant and diagnostic mammography and ultrasound performed in June 2023 showed an 8mm suspicious finding in the 3:00 region 8 cm from the nipple.  Ultrasound-guided core biopsy performed in July 2023 showed a moderately differentiated invasive duct cancer which was ER/SC strongly positive and HER2/ronaldo negative.  She underwent an MRI in August 2023 showing the known left breast 3:00 cancer measuring 2.3 cm and some enhancement superior and medial to the known cancer in the left breast as well as a finding in the right breast 6:00 region.  MRI core biopsies in August 2023 showed a separate left breast anterior 3:00 finding and right breast 6:00 finding to be intraductal papillomas with no atypia.  Partial mastectomy was recommended with sentinel lymph node biopsy and I also removed the separate left breast 3:00 papilloma within the same partial mastectomy on September 19, 2023.  The final pathology showed a 1.6 cm intermediate grade invasive duct cancer with negative margins and 3 negative left axillary lymph nodes making this a pathologic prognostic stage IA breast cancer.  Oncotype recurrence score was 9 and she was seen by Dr. Major and placed on anastrozole after radiation.  She underwent external beam radiation with Dr. Cast which finished in December 2023.  She did require a seroma catheter postoperatively.  She comes in now for routine follow-up. [FreeTextEntry2] : September 19, 2023

## 2025-05-18 NOTE — PHYSICAL EXAM
[Normal] : oriented to person, place and time, the affect was normal, the mood was normal and not anxious [de-identified] : s/p left partial mastectomy

## 2025-05-18 NOTE — DISEASE MANAGEMENT
[Pathological] : TNM Stage: p [IA] : IA [FreeTextEntry4] : ER/MN+ Invasive Ductal Carcinoma of the Left Breast  [TTNM] : 1c [NTNM] : 0 [MTNM] : 0

## 2025-05-18 NOTE — HISTORY OF PRESENT ILLNESS
[FreeTextEntry1] : Ms. Burt is a 79-year-old female with Stage IA T1cN0 left breast invasive ductal carcinoma ER/OR+. She is s/p left partial mastectomy and sentinel lymph node biopsy followed by adjuvant radiation therapy. She is present today for her routine follow-up.   Of note, she does have a history of ovarian cancer and received radioactive phosphorous 32 intraperitoneal in 1988 and has been MILLY since.   Ms. Burt initially presented for a screening mammogram to [facility] on 5/18/23 that revealed an asymmetric density of the upper left breast. Diagnostic mammogram was completed on 6/26/23 and revealed a hypoechoic 9 mm mass 8 cm from the nipple corresponding to the initial findings.   She underwent an US-guided biopsy of the left breast on 7/3/23, pathology demonstrated an intermediate grade invasive ductal carcinoma which was ER/OR strongly positive.   An MRI of the breast was completed on 8/15/23 that showed the known left breast cancer at 3:00 measuring 2.3 cm with an area of enhancement just superior and medial to the known cancer as well as a finding in the right breast at the 6:00 axis and an MRI-guided biopsy was recommended. On 8/22/23 an MRI-guided biopsy was performed and showed in the left breast 3:00 anterior and right breast 6:00  revealed intraductal papillomas with ductal hyperplasia but no atypia.   On 9/19/23 Ms. Burt underwent a left partial mastectomy and sentinel lymph node biopsy with excision of the separate 3:00 papilloma in the same specimen performed by  Dr. Carcamo. Final pathology revealed a 1.6 cm intermediate grade invasive ductal carcinoma with neuroendocrine features with a 1.6cm focus of grade II DCIS, negative for EIC. All margins were negative with the nearest margin being 8 mm from the inferior margin for both the DCIS and the invasive component. 0/3 lymph nodes positive for metastasis.   Oncotype score was 9. Per patient, genetics testing had been completed previously and was negative.   Ms. Burt was treated with and completed adjuvant radiation therapy to the left breast, to a total of 4240 cGy on 12/18/23.   Ms. Burt  underwent bilateral mammography and ultrasound (July 2024) that demonstrated no mammographic or sonographic evidence of malignancy. She had follow-up with medical oncology in September. She reports she fell walking and broke her hand. She is now in PT/OT. She is otherwise is feeling well and she is staying active.   Ms. Burt is present for her routine follow-up. She offers no new symptoms or significant discomfort.  She continues on Anastrozole. No issues noticeable side effects. She has follow-up with Dr. Carcamo scheduled in September.  She is due for Mammo and US in July. She is physically active by going to the gym regularly.

## 2025-05-18 NOTE — PHYSICAL EXAM
[Normal] : oriented to person, place and time, the affect was normal, the mood was normal and not anxious [de-identified] : s/p left partial mastectomy

## 2025-05-18 NOTE — DISEASE MANAGEMENT
[Pathological] : TNM Stage: p [IA] : IA [FreeTextEntry4] : ER/CA+ Invasive Ductal Carcinoma of the Left Breast  [TTNM] : 1c [NTNM] : 0 [MTNM] : 0

## 2025-05-18 NOTE — HISTORY OF PRESENT ILLNESS
[FreeTextEntry1] : Ms. Burt is a 79-year-old female with Stage IA T1cN0 left breast invasive ductal carcinoma ER/PA+. She is s/p left partial mastectomy and sentinel lymph node biopsy followed by adjuvant radiation therapy. She is present today for her routine follow-up.   Of note, she does have a history of ovarian cancer and received radioactive phosphorous 32 intraperitoneal in 1988 and has been MILLY since.   Ms. Burt initially presented for a screening mammogram to [facility] on 5/18/23 that revealed an asymmetric density of the upper left breast. Diagnostic mammogram was completed on 6/26/23 and revealed a hypoechoic 9 mm mass 8 cm from the nipple corresponding to the initial findings.   She underwent an US-guided biopsy of the left breast on 7/3/23, pathology demonstrated an intermediate grade invasive ductal carcinoma which was ER/PA strongly positive.   An MRI of the breast was completed on 8/15/23 that showed the known left breast cancer at 3:00 measuring 2.3 cm with an area of enhancement just superior and medial to the known cancer as well as a finding in the right breast at the 6:00 axis and an MRI-guided biopsy was recommended. On 8/22/23 an MRI-guided biopsy was performed and showed in the left breast 3:00 anterior and right breast 6:00  revealed intraductal papillomas with ductal hyperplasia but no atypia.   On 9/19/23 Ms. Burt underwent a left partial mastectomy and sentinel lymph node biopsy with excision of the separate 3:00 papilloma in the same specimen performed by  Dr. Carcamo. Final pathology revealed a 1.6 cm intermediate grade invasive ductal carcinoma with neuroendocrine features with a 1.6cm focus of grade II DCIS, negative for EIC. All margins were negative with the nearest margin being 8 mm from the inferior margin for both the DCIS and the invasive component. 0/3 lymph nodes positive for metastasis.   Oncotype score was 9. Per patient, genetics testing had been completed previously and was negative.   Ms. Burt was treated with and completed adjuvant radiation therapy to the left breast, to a total of 4240 cGy on 12/18/23.   Ms. Burt  underwent bilateral mammography and ultrasound (July 2024) that demonstrated no mammographic or sonographic evidence of malignancy. She had follow-up with medical oncology in September. She reports she fell walking and broke her hand. She is now in PT/OT. She is otherwise is feeling well and she is staying active.   Ms. Burt is present for her routine follow-up. She offers no new symptoms or significant discomfort.  She continues on Anastrozole. No issues noticeable side effects. She has follow-up with Dr. Carcamo scheduled in September.  She is due for Mammo and US in July. She is physically active by going to the gym regularly.

## 2025-05-18 NOTE — PHYSICAL EXAM
[Normal] : oriented to person, place and time, the affect was normal, the mood was normal and not anxious [de-identified] : s/p left partial mastectomy

## 2025-05-18 NOTE — DISEASE MANAGEMENT
[Pathological] : TNM Stage: p [IA] : IA [FreeTextEntry4] : ER/ID+ Invasive Ductal Carcinoma of the Left Breast  [TTNM] : 1c [NTNM] : 0 [MTNM] : 0

## 2025-05-18 NOTE — HISTORY OF PRESENT ILLNESS
[FreeTextEntry1] : Ms. Burt is a 79-year-old female with Stage IA T1cN0 left breast invasive ductal carcinoma ER/TN+. She is s/p left partial mastectomy and sentinel lymph node biopsy followed by adjuvant radiation therapy. She is present today for her routine follow-up.   Of note, she does have a history of ovarian cancer and received radioactive phosphorous 32 intraperitoneal in 1988 and has been MILLY since.   Ms. Burt initially presented for a screening mammogram to [facility] on 5/18/23 that revealed an asymmetric density of the upper left breast. Diagnostic mammogram was completed on 6/26/23 and revealed a hypoechoic 9 mm mass 8 cm from the nipple corresponding to the initial findings.   She underwent an US-guided biopsy of the left breast on 7/3/23, pathology demonstrated an intermediate grade invasive ductal carcinoma which was ER/TN strongly positive.   An MRI of the breast was completed on 8/15/23 that showed the known left breast cancer at 3:00 measuring 2.3 cm with an area of enhancement just superior and medial to the known cancer as well as a finding in the right breast at the 6:00 axis and an MRI-guided biopsy was recommended. On 8/22/23 an MRI-guided biopsy was performed and showed in the left breast 3:00 anterior and right breast 6:00  revealed intraductal papillomas with ductal hyperplasia but no atypia.   On 9/19/23 Ms. Burt underwent a left partial mastectomy and sentinel lymph node biopsy with excision of the separate 3:00 papilloma in the same specimen performed by  Dr. Carcamo. Final pathology revealed a 1.6 cm intermediate grade invasive ductal carcinoma with neuroendocrine features with a 1.6cm focus of grade II DCIS, negative for EIC. All margins were negative with the nearest margin being 8 mm from the inferior margin for both the DCIS and the invasive component. 0/3 lymph nodes positive for metastasis.   Oncotype score was 9. Per patient, genetics testing had been completed previously and was negative.   Ms. Burt was treated with and completed adjuvant radiation therapy to the left breast, to a total of 4240 cGy on 12/18/23.   Ms. Burt  underwent bilateral mammography and ultrasound (July 2024) that demonstrated no mammographic or sonographic evidence of malignancy. She had follow-up with medical oncology in September. She reports she fell walking and broke her hand. She is now in PT/OT. She is otherwise is feeling well and she is staying active.   Ms. Burt is present for her routine follow-up. She offers no new symptoms or significant discomfort.  She continues on Anastrozole. No issues noticeable side effects. She has follow-up with Dr. Carcamo scheduled in September.  She is due for Mammo and US in July. She is physically active by going to the gym regularly.

## 2025-07-15 NOTE — PHYSICAL EXAM
[de-identified] : General: No acute distress Respiratory: Nonlabored Cardiovascular: Warm and well-perfused   Right small and ring fingers Skin: Intact, minor swelling with osteophytes around the PIP joints, no ecchymosis Motor: AIN PIN M R U motor intact Sensation: No numbness or tingling Can extend fingers but the PIP joints both have about a 5 degree flexion contracture on flexion she can make a full fist but the PIP joint range of motion is only to about 100 [de-identified] : 3 views of the right hand were obtained reviewed in clinic showing arthritic degeneration of the PIP joints of osteophyte formation and joint space narrowing.  There is evidence of old fracture healing at the necks of the ring and small finger metacarpals

## 2025-07-15 NOTE — HISTORY OF PRESENT ILLNESS
[Right] : right hand dominant [FreeTextEntry1] : October 2024 had a fall and broke a bone in hand. Had a removable splint. Stiffness since the injury. PIPJ OA small and ring right side

## 2025-07-15 NOTE — ASSESSMENT
[FreeTextEntry1] : 80-year-old female with arthritis of the right ring and small finger PIP joints  Explained that some of her stiffness is likely related to this arthritis and only got exacerbated from the recent fall.  Range of motion is quite good but is not full compared to the other side I believe this is related to the arthritis.  I discussed treatments for arthritis consisting of therapy Voltaren gel Tylenol Motrin and steroid injections.  If these knees do not resolve the symptoms are surgery such as joint replacements and fusions.  I discussed the risk benefits and alternatives these include infection bleeding and need for surgery.  Patient would like to continue with therapy at this time which I think is a good idea.  She will return in 4 to 6 weeks if no improvement

## 2025-07-15 NOTE — PHYSICAL EXAM
[de-identified] : General: No acute distress Respiratory: Nonlabored Cardiovascular: Warm and well-perfused   Right small and ring fingers Skin: Intact, minor swelling with osteophytes around the PIP joints, no ecchymosis Motor: AIN PIN M R U motor intact Sensation: No numbness or tingling Can extend fingers but the PIP joints both have about a 5 degree flexion contracture on flexion she can make a full fist but the PIP joint range of motion is only to about 100 [de-identified] : 3 views of the right hand were obtained reviewed in clinic showing arthritic degeneration of the PIP joints of osteophyte formation and joint space narrowing.  There is evidence of old fracture healing at the necks of the ring and small finger metacarpals

## 2025-07-16 NOTE — DISCUSSION/SUMMARY
[de-identified] : I had a long discussion with the patient about the nature of their ankle problem and all available treatment options. We discussed operative and non-operative interventions. After a long discussion, they would like to proceed with structured physical therapy. We also discussed use of anti-inflammatory medications as well as their risks and benefits. I will see them back in approximately 6 weeks time. If they are still having pain, I would recommend consideration for an MRI of the ankle. All of their questions were answered, they agree with the treatment plan.

## 2025-07-16 NOTE — PHYSICAL EXAM
[de-identified] : Well-appearing female in no acute distress.  Alert and oriented x 3.  Peers her stated age.  Normal [de-identified] : 3 views left ankle ordered and reviewed.  These demonstrate no acute osseous abnormalities.  Joint spaces well-maintained.  I do not see any evidence of a displaced fracture

## 2025-07-16 NOTE — PHYSICAL EXAM
[de-identified] : Well-appearing female in no acute distress.  Alert and oriented x 3.  Peers her stated age.  Normal [de-identified] : 3 views left ankle ordered and reviewed.  These demonstrate no acute osseous abnormalities.  Joint spaces well-maintained.  I do not see any evidence of a displaced fracture

## 2025-07-16 NOTE — DISCUSSION/SUMMARY
[de-identified] : I had a long discussion with the patient about the nature of their ankle problem and all available treatment options. We discussed operative and non-operative interventions. After a long discussion, they would like to proceed with structured physical therapy. We also discussed use of anti-inflammatory medications as well as their risks and benefits. I will see them back in approximately 6 weeks time. If they are still having pain, I would recommend consideration for an MRI of the ankle. All of their questions were answered, they agree with the treatment plan.

## 2025-07-16 NOTE — HISTORY OF PRESENT ILLNESS
[de-identified] : Very pleasant 80-year-old female comes in for left ankle pain.  This has been going on since she fell while she was working out.  Pain is on the lateral side of the knee.  No issues with the ankle in the past.  No recent treatment.

## 2025-07-16 NOTE — HISTORY OF PRESENT ILLNESS
[de-identified] : Very pleasant 80-year-old female comes in for left ankle pain.  This has been going on since she fell while she was working out.  Pain is on the lateral side of the knee.  No issues with the ankle in the past.  No recent treatment.